# Patient Record
Sex: FEMALE | Race: WHITE | HISPANIC OR LATINO | Employment: UNEMPLOYED | URBAN - NONMETROPOLITAN AREA
[De-identification: names, ages, dates, MRNs, and addresses within clinical notes are randomized per-mention and may not be internally consistent; named-entity substitution may affect disease eponyms.]

---

## 2019-04-11 ENCOUNTER — OFFICE VISIT (OUTPATIENT)
Dept: URGENT CARE | Facility: PHYSICIAN GROUP | Age: 59
End: 2019-04-11
Payer: OTHER GOVERNMENT

## 2019-04-11 VITALS
TEMPERATURE: 98.5 F | OXYGEN SATURATION: 98 % | WEIGHT: 125.6 LBS | RESPIRATION RATE: 16 BRPM | SYSTOLIC BLOOD PRESSURE: 110 MMHG | HEIGHT: 63 IN | DIASTOLIC BLOOD PRESSURE: 80 MMHG | HEART RATE: 77 BPM | BODY MASS INDEX: 22.25 KG/M2

## 2019-04-11 DIAGNOSIS — R10.9 BILATERAL FLANK PAIN: ICD-10-CM

## 2019-04-11 DIAGNOSIS — R10.11 RUQ PAIN: ICD-10-CM

## 2019-04-11 DIAGNOSIS — R10.31 RLQ ABDOMINAL PAIN: ICD-10-CM

## 2019-04-11 LAB
APPEARANCE UR: CLEAR
BILIRUB UR STRIP-MCNC: NEGATIVE MG/DL
COLOR UR AUTO: YELLOW
GLUCOSE UR STRIP.AUTO-MCNC: NEGATIVE MG/DL
KETONES UR STRIP.AUTO-MCNC: NEGATIVE MG/DL
LEUKOCYTE ESTERASE UR QL STRIP.AUTO: NEGATIVE
NITRITE UR QL STRIP.AUTO: POSITIVE
PH UR STRIP.AUTO: 5.5 [PH] (ref 5–8)
PROT UR QL STRIP: NEGATIVE MG/DL
RBC UR QL AUTO: NEGATIVE
SP GR UR STRIP.AUTO: 1.02
UROBILINOGEN UR STRIP-MCNC: 0.2 MG/DL

## 2019-04-11 PROCEDURE — 99204 OFFICE O/P NEW MOD 45 MIN: CPT | Performed by: PHYSICIAN ASSISTANT

## 2019-04-11 PROCEDURE — 81002 URINALYSIS NONAUTO W/O SCOPE: CPT | Performed by: PHYSICIAN ASSISTANT

## 2019-04-11 RX ORDER — NITROFURANTOIN 25; 75 MG/1; MG/1
100 CAPSULE ORAL 2 TIMES DAILY
Qty: 10 CAP | Refills: 0 | Status: SHIPPED | OUTPATIENT
Start: 2019-04-11 | End: 2019-05-02

## 2019-04-11 NOTE — PROGRESS NOTES
Chief Complaint   Patient presents with   • Abdominal Pain     all left side   • Bloated       HISTORY OF PRESENT ILLNESS: Patient is a 58 y.o. female who presents today for the following:    Patient comes in for evaluation of a 2-day history of left flank pain.  She states it starts in the left flank and radiates around to the left anterior inferior ribs.  It does make breathing slightly difficult.  She denies any injuries, rashes, and skin changes.  She feels like the pain is deep, stating her skin does not seem to be sensitive.  She denies any history of the same.  She denies fever, recent illness, changes in bowels.  She had a colonoscopy and EGD approximately 1 month.  She states that her siblings have a history of cancer and they are making sure she does not.  Patient's LMP was 10 years ago.  She has no history of any surgeries.    There are no active problems to display for this patient.      Allergies:Patient has no known allergies.    Current Outpatient Prescriptions Ordered in [facility]   Medication Sig Dispense Refill   • nitrofurantoin monohyd macro (MACROBID) 100 MG Cap Take 1 Cap by mouth 2 times a day. 10 Cap 0     No current Epic-ordered facility-administered medications on file.        No past medical history on file.    Social History   Substance Use Topics   • Smoking status: Never Smoker   • Smokeless tobacco: Never Used   • Alcohol use No       No family status information on file.   No family history on file.    Review of Systems:    Constitutional ROS: No unexpected change in weight, No weakness, No fatigue  Eye ROS: No recent significant change in vision, No eye pain, redness, discharge  Ear ROS: No drainage, No tinnitus or vertigo, No recent change in hearing  Mouth/Throat ROS: No teeth or gum problems, No bleeding gums, No tongue complaints  Neck ROS: No swollen glands, No significant pain in neck  Pulmonary ROS: No chronic cough, sputum, or hemoptysis, No dyspnea on exertion, No  "wheezing  Cardiovascular ROS: No diaphoresis, No edema, No palpitations  Gastrointestinal ROS: No change in bowel habits, No significant change in appetite, No nausea, vomiting, diarrhea, or constipation  Musculoskeletal/Extremities ROS: No peripheral edema, No pain, redness or swelling on the joints  Hematologic/Lymphatic ROS: No chills, No night sweats, No weight loss  Skin/Integumentary ROS: No edema, No evidence of rash, No itching      Exam:  /80   Pulse 77   Temp 36.9 °C (98.5 °F) (Temporal)   Resp 16   Ht 1.6 m (5' 3\")   Wt 57 kg (125 lb 9.6 oz)   SpO2 98%   General: Well developed, well nourished. No distress.  Pulmonary: Unlabored respiratory effort. Lungs clear to auscultation, no wheezes, no rhonchi.  Cardiovascular: Regular rate and rhythm without murmur.     Back: Mild flank tenderness noted bilaterally without overt CVA tenderness.  Abdomen: Soft,  nondistended. No hepatosplenomegaly.  Right upper quadrant and right lower quadrant tenderness noted without guarding or rebound.  Neurologic: Grossly nonfocal. No facial asymmetry noted.  Skin: Warm, dry, good turgor. No rashes in visible areas.   Psych: Normal mood. Alert and oriented x3. Judgment and insight is normal.    UA: Positive nitrates, otherwise negative    Assessment/Plan:  Patient states she was treated for urinary tract infection about a month ago.  She continues to have cloudy urine in the mornings and has had cloudy urine with a foul odor today.  Her only new symptoms are bilateral flank pain and right lower quadrant pain.  UA is abnormal.  Given presenting symptoms will start antibiotics and contact patient with culture results.  Discussed ER precautions for worsening symptoms.    Entire clinic visit was conducted using  #[id number], Brennen.  1. RUQ pain  POCT Urinalysis    nitrofurantoin monohyd macro (MACROBID) 100 MG Cap   2. RLQ abdominal pain  POCT Urinalysis    nitrofurantoin monohyd macro (MACROBID) 100 MG Cap   3. " Bilateral flank pain  POCT Urinalysis    nitrofurantoin monohyd macro (MACROBID) 100 MG Cap

## 2019-04-15 ENCOUNTER — TELEPHONE (OUTPATIENT)
Dept: URGENT CARE | Facility: PHYSICIAN GROUP | Age: 59
End: 2019-04-15

## 2019-04-15 DIAGNOSIS — R39.9 URINARY SYMPTOM OR SIGN: ICD-10-CM

## 2019-04-15 NOTE — TELEPHONE ENCOUNTER
1. Caller Name: Pt.                                         Call Back Number: [phone] (home)       Patient approves a detailed voicemail message: yes    2. Patient is requesting results for UA. Stated you ran a UA and it was abnormal so you ordered it to be sent to lab. I dont see order in chart or any results for a urinalysis just a POCT. Pt. did confirm that she did get her RX from pharmacy.

## 2019-04-15 NOTE — TELEPHONE ENCOUNTER
It does not appear that I ordered a culture. I will put an order in the computer right now so if she is still having symptoms, have her go to a lab to leave a urine sample.

## 2019-04-18 ENCOUNTER — HOSPITAL ENCOUNTER (OUTPATIENT)
Facility: MEDICAL CENTER | Age: 59
End: 2019-04-18
Attending: PHYSICIAN ASSISTANT
Payer: OTHER GOVERNMENT

## 2019-04-18 DIAGNOSIS — R39.9 URINARY SYMPTOM OR SIGN: ICD-10-CM

## 2019-04-18 PROCEDURE — 87086 URINE CULTURE/COLONY COUNT: CPT

## 2019-04-18 PROCEDURE — 87077 CULTURE AEROBIC IDENTIFY: CPT

## 2019-04-18 PROCEDURE — 87186 SC STD MICRODIL/AGAR DIL: CPT

## 2019-04-18 NOTE — TELEPHONE ENCOUNTER
Phone Number Called: [phone] (home)     Message: LVM for pt. With below message    Left Message for patient to call back: N\A

## 2019-04-21 LAB
BACTERIA UR CULT: ABNORMAL
BACTERIA UR CULT: ABNORMAL
SIGNIFICANT IND 70042: ABNORMAL
SITE SITE: ABNORMAL
SOURCE SOURCE: ABNORMAL

## 2019-04-22 DIAGNOSIS — R39.9 URINARY SYMPTOM OR SIGN: ICD-10-CM

## 2019-04-22 RX ORDER — CEFDINIR 300 MG/1
300 CAPSULE ORAL 2 TIMES DAILY
Qty: 10 CAP | Refills: 0 | Status: SHIPPED | OUTPATIENT
Start: 2019-04-22 | End: 2019-04-27

## 2019-04-23 ENCOUNTER — TELEPHONE (OUTPATIENT)
Dept: MEDICAL GROUP | Facility: PHYSICIAN GROUP | Age: 59
End: 2019-04-23

## 2019-04-23 NOTE — TELEPHONE ENCOUNTER
----- Message from Janet Jama P.A.-C. sent at 4/22/2019 10:08 AM PDT -----  Please let pt know the urine culture is positive for infection.  I will send a second antibiotic to the pharmacy if patient continues to have symptoms.

## 2019-05-02 ENCOUNTER — OFFICE VISIT (OUTPATIENT)
Dept: URGENT CARE | Facility: PHYSICIAN GROUP | Age: 59
End: 2019-05-02
Payer: OTHER GOVERNMENT

## 2019-05-02 ENCOUNTER — HOSPITAL ENCOUNTER (OUTPATIENT)
Facility: MEDICAL CENTER | Age: 59
End: 2019-05-02
Attending: PHYSICIAN ASSISTANT
Payer: OTHER GOVERNMENT

## 2019-05-02 VITALS
SYSTOLIC BLOOD PRESSURE: 112 MMHG | DIASTOLIC BLOOD PRESSURE: 82 MMHG | RESPIRATION RATE: 16 BRPM | BODY MASS INDEX: 22.32 KG/M2 | TEMPERATURE: 98.1 F | OXYGEN SATURATION: 98 % | HEART RATE: 64 BPM | WEIGHT: 126 LBS

## 2019-05-02 DIAGNOSIS — R10.9 BILATERAL FLANK PAIN: ICD-10-CM

## 2019-05-02 DIAGNOSIS — R30.0 DYSURIA: ICD-10-CM

## 2019-05-02 LAB
APPEARANCE UR: CLEAR
BILIRUB UR STRIP-MCNC: NORMAL MG/DL
COLOR UR AUTO: YELLOW
GLUCOSE UR STRIP.AUTO-MCNC: NORMAL MG/DL
KETONES UR STRIP.AUTO-MCNC: NORMAL MG/DL
LEUKOCYTE ESTERASE UR QL STRIP.AUTO: NORMAL
NITRITE UR QL STRIP.AUTO: NORMAL
PH UR STRIP.AUTO: 5 [PH] (ref 5–8)
PROT UR QL STRIP: NORMAL MG/DL
RBC UR QL AUTO: NORMAL
SP GR UR STRIP.AUTO: 1.02
UROBILINOGEN UR STRIP-MCNC: 0.2 MG/DL

## 2019-05-02 PROCEDURE — 81002 URINALYSIS NONAUTO W/O SCOPE: CPT | Performed by: PHYSICIAN ASSISTANT

## 2019-05-02 PROCEDURE — 99214 OFFICE O/P EST MOD 30 MIN: CPT | Performed by: PHYSICIAN ASSISTANT

## 2019-05-02 PROCEDURE — 87086 URINE CULTURE/COLONY COUNT: CPT

## 2019-05-02 RX ORDER — PHENAZOPYRIDINE HYDROCHLORIDE 200 MG/1
200 TABLET, FILM COATED ORAL 3 TIMES DAILY PRN
Qty: 15 TAB | Refills: 0 | Status: SHIPPED | OUTPATIENT
Start: 2019-05-02 | End: 2022-04-08

## 2019-05-02 RX ORDER — CEFDINIR 300 MG/1
300 CAPSULE ORAL EVERY 12 HOURS
Qty: 10 CAP | Refills: 0 | Status: SHIPPED | OUTPATIENT
Start: 2019-05-02 | End: 2019-05-07

## 2019-05-02 NOTE — PROGRESS NOTES
"Chief Complaint   Patient presents with   • UTI     Patient has been treated 2x in the last 3 months    • Back Pain     x 2 days        HISTORY OF PRESENT ILLNESS: Patient is a 59 y.o. female who presents today for the following:    Patient comes in with her  for evaluation of \"hot\" urine over the last couple of days.  She complains of bilateral flank pain, right worse than left.  She does report some lower abdominal discomfort but primarily in the urinary tract.  She denies fever, night sweats, chills, body aches, nausea, vomiting.  She has recently been treated for urinary tract infection.  She does not currently have a primary care provider.    There are no active problems to display for this patient.      Allergies:Patient has no known allergies.    Current Outpatient Prescriptions Ordered in [facility]   Medication Sig Dispense Refill   • cefdinir (OMNICEF) 300 MG Cap Take 1 Cap by mouth every 12 hours for 5 days. DO NOT TAKE unless symptoms unless symptoms worsen 10 Cap 0   • phenazopyridine (PYRIDIUM) 200 MG Tab Take 1 Tab by mouth 3 times a day as needed for Mild Pain or Moderate Pain. 15 Tab 0   • Pseudoephedrine-DM-GG (ROBITUSSIN COLD & COUGH PO) Take  by mouth.       No current Epic-ordered facility-administered medications on file.        No past medical history on file.    Social History   Substance Use Topics   • Smoking status: Never Smoker   • Smokeless tobacco: Never Used   • Alcohol use No       No family status information on file.   No family history on file.    Review of Systems:   Constitutional ROS: No unexpected change in weight, No weakness, No fatigue  Eye ROS: No recent significant change in vision, No eye pain, redness, discharge  Ear ROS: No drainage, No tinnitus or vertigo, No recent change in hearing  Mouth/Throat ROS: No teeth or gum problems, No bleeding gums, No tongue complaints  Neck ROS: No swollen glands, No significant pain in neck  Pulmonary ROS: No chronic cough, sputum, or " hemoptysis, No dyspnea on exertion, No wheezing  Cardiovascular ROS: No diaphoresis, No edema, No palpitations  Gastrointestinal ROS: No change in bowel habits, No significant change in appetite, No nausea, vomiting, diarrhea, or constipation  Musculoskeletal/Extremities ROS: No peripheral edema, No pain, redness or swelling on the joints  Hematologic/Lymphatic ROS: No chills, No night sweats, No weight loss  Skin/Integumentary ROS: No edema, No evidence of rash, No itching      Exam:  /82   Pulse 64   Temp 36.7 °C (98.1 °F) (Temporal)   Resp 16   Wt 57.2 kg (126 lb)   SpO2 98%   General: Well developed, well nourished. No distress.  Pulmonary: Unlabored respiratory effort.   Back: No CVA tenderness noted.  Neurologic: Grossly nonfocal. No facial asymmetry noted.  Skin: Warm, dry, good turgor. No rashes in visible areas.   Psych: Normal mood. Alert and oriented x3. Judgment and insight is normal.    UA: Trace leukocytes, otherwise negative    Assessment/Plan:  Discussed UA results with the patient and her .  Will have patient start Pyridium to hold off on antibiotics for now.  Will contact patient with culture results but have advised taking antibiotics if symptoms worsen prior to culture availability.  Drink plenty fluid. Follow up for worsening or persistent symptoms.  1. Bilateral flank pain  POCT Urinalysis    Urine Culture    cefdinir (OMNICEF) 300 MG Cap   2. Dysuria  POCT Urinalysis    Urine Culture    cefdinir (OMNICEF) 300 MG Cap    phenazopyridine (PYRIDIUM) 200 MG Tab

## 2019-05-04 LAB
BACTERIA UR CULT: NORMAL
SIGNIFICANT IND 70042: NORMAL
SITE SITE: NORMAL
SOURCE SOURCE: NORMAL

## 2019-05-05 ENCOUNTER — TELEPHONE (OUTPATIENT)
Dept: URGENT CARE | Facility: PHYSICIAN GROUP | Age: 59
End: 2019-05-05

## 2019-05-05 NOTE — TELEPHONE ENCOUNTER
----- Message from Janet Jama P.A.-C. sent at 5/5/2019  9:02 AM PDT -----  Please let pt know the urine culture is negative. Follow up for persistent symptoms.

## 2019-12-21 ENCOUNTER — APPOINTMENT (OUTPATIENT)
Dept: URGENT CARE | Facility: PHYSICIAN GROUP | Age: 59
End: 2019-12-21
Payer: OTHER GOVERNMENT

## 2022-04-08 ENCOUNTER — OFFICE VISIT (OUTPATIENT)
Dept: URGENT CARE | Facility: PHYSICIAN GROUP | Age: 62
End: 2022-04-08

## 2022-04-08 VITALS
TEMPERATURE: 97.9 F | BODY MASS INDEX: 21.34 KG/M2 | WEIGHT: 113 LBS | OXYGEN SATURATION: 98 % | HEART RATE: 88 BPM | DIASTOLIC BLOOD PRESSURE: 72 MMHG | RESPIRATION RATE: 16 BRPM | SYSTOLIC BLOOD PRESSURE: 122 MMHG | HEIGHT: 61 IN

## 2022-04-08 DIAGNOSIS — R59.0 LYMPHADENOPATHY, AXILLARY: ICD-10-CM

## 2022-04-08 DIAGNOSIS — T14.8XXA INFECTED WOUND: ICD-10-CM

## 2022-04-08 DIAGNOSIS — L08.9 INFECTED WOUND: ICD-10-CM

## 2022-04-08 PROCEDURE — 99214 OFFICE O/P EST MOD 30 MIN: CPT | Mod: 25

## 2022-04-08 PROCEDURE — 90471 IMMUNIZATION ADMIN: CPT

## 2022-04-08 PROCEDURE — 90714 TD VACC NO PRESV 7 YRS+ IM: CPT

## 2022-04-08 RX ORDER — CLINDAMYCIN HYDROCHLORIDE 300 MG/1
300 CAPSULE ORAL 3 TIMES DAILY
Qty: 15 CAPSULE | Refills: 0 | Status: SHIPPED | OUTPATIENT
Start: 2022-04-08 | End: 2022-04-13

## 2022-04-08 ASSESSMENT — ENCOUNTER SYMPTOMS
RESPIRATORY NEGATIVE: 1
NEUROLOGICAL NEGATIVE: 1
MUSCULOSKELETAL NEGATIVE: 1
CHILLS: 0
CARDIOVASCULAR NEGATIVE: 1
EYES NEGATIVE: 1
FEVER: 0
PSYCHIATRIC NEGATIVE: 1
ROS SKIN COMMENTS: INFECTED WOUND
GASTROINTESTINAL NEGATIVE: 1

## 2023-12-06 ENCOUNTER — OFFICE VISIT (OUTPATIENT)
Dept: URGENT CARE | Facility: PHYSICIAN GROUP | Age: 63
End: 2023-12-06
Payer: OTHER GOVERNMENT

## 2023-12-06 VITALS
OXYGEN SATURATION: 96 % | RESPIRATION RATE: 14 BRPM | WEIGHT: 114 LBS | DIASTOLIC BLOOD PRESSURE: 76 MMHG | HEART RATE: 73 BPM | BODY MASS INDEX: 21.54 KG/M2 | TEMPERATURE: 98.1 F | SYSTOLIC BLOOD PRESSURE: 100 MMHG

## 2023-12-06 DIAGNOSIS — J06.9 VIRAL URI: ICD-10-CM

## 2023-12-06 DIAGNOSIS — R05.1 ACUTE COUGH: ICD-10-CM

## 2023-12-06 PROCEDURE — 3078F DIAST BP <80 MM HG: CPT | Performed by: PHYSICIAN ASSISTANT

## 2023-12-06 PROCEDURE — 99213 OFFICE O/P EST LOW 20 MIN: CPT | Performed by: PHYSICIAN ASSISTANT

## 2023-12-06 PROCEDURE — 3074F SYST BP LT 130 MM HG: CPT | Performed by: PHYSICIAN ASSISTANT

## 2023-12-06 RX ORDER — BENZONATATE 100 MG/1
100 CAPSULE ORAL 3 TIMES DAILY PRN
Qty: 60 CAPSULE | Refills: 0 | Status: SHIPPED | OUTPATIENT
Start: 2023-12-06

## 2023-12-06 ASSESSMENT — ENCOUNTER SYMPTOMS
SHORTNESS OF BREATH: 0
COUGH: 1
FEVER: 0
HEADACHES: 1
VOMITING: 0
DIARRHEA: 0
SORE THROAT: 1
NAUSEA: 0
EYE DISCHARGE: 0
EYE REDNESS: 0
MYALGIAS: 1

## 2023-12-06 NOTE — PROGRESS NOTES
Subjective     Anisha Ahn is a 63 y.o. female who presents with Cough (Causing chest pain)            URI   This is a new problem. Episode onset: x 5 days ago. The problem has been gradually worsening. There has been no fever. Associated symptoms include chest pain (The patient reports soreness to her chest wall secondary to coughing.), congestion, coughing, headaches, a plugged ear sensation and a sore throat. Pertinent negatives include no diarrhea, ear pain, nausea or vomiting. Treatments tried: OTC cough drops.     The patient states her  was recently sick with similar symptoms.  The patient reports no known exposure to COVID-19 and/or influenza.    PMH:  has no past medical history on file.  MEDS: No current outpatient medications on file.  ALLERGIES: No Known Allergies  SURGHX: No past surgical history on file.  SOCHX:  reports that she has never smoked. She has never used smokeless tobacco. She reports that she does not drink alcohol and does not use drugs.  FH: Family history was reviewed, no pertinent findings to report    Review of Systems   Constitutional:  Negative for fever.   HENT:  Positive for congestion and sore throat. Negative for ear pain.    Eyes:  Negative for discharge and redness.   Respiratory:  Positive for cough. Negative for shortness of breath.    Cardiovascular:  Positive for chest pain (The patient reports soreness to her chest wall secondary to coughing.).   Gastrointestinal:  Negative for diarrhea, nausea and vomiting.   Musculoskeletal:  Positive for myalgias.   Neurological:  Positive for headaches.              Objective     /76   Pulse 73   Temp 36.7 °C (98.1 °F) (Temporal)   Resp 14   Wt 51.7 kg (114 lb)   SpO2 96%   BMI 21.54 kg/m²      Physical Exam  Constitutional:       General: She is not in acute distress.     Appearance: Normal appearance. She is not ill-appearing.   HENT:      Head: Normocephalic and atraumatic.      Right Ear: Tympanic  membrane, ear canal and external ear normal.      Left Ear: Tympanic membrane, ear canal and external ear normal.      Nose: Nose normal.      Mouth/Throat:      Mouth: Mucous membranes are moist.      Pharynx: Oropharynx is clear. Uvula midline. Posterior oropharyngeal erythema (mild) present.      Tonsils: No tonsillar exudate.   Eyes:      Extraocular Movements: Extraocular movements intact.      Conjunctiva/sclera: Conjunctivae normal.   Cardiovascular:      Rate and Rhythm: Normal rate and regular rhythm.      Heart sounds: Normal heart sounds.   Pulmonary:      Effort: Pulmonary effort is normal. No respiratory distress.      Breath sounds: Normal breath sounds. No wheezing.   Musculoskeletal:         General: Normal range of motion.      Cervical back: Normal range of motion and neck supple.   Skin:     General: Skin is warm and dry.   Neurological:      Mental Status: She is alert and oriented to person, place, and time.             Progress:  The patient declined viral testing at this time.             Assessment & Plan         services were used throughout the duration of this encounter.     1. Viral URI    2. Acute cough  - benzonatate (TESSALON) 100 MG Cap; Take 1 Capsule by mouth 3 times a day as needed for Cough.  Dispense: 60 Capsule; Refill: 0    The patient's presenting symptoms and physical exam findings are consistent with a viral URI with associated cough.  The patient's physical exam today in clinic was normal, with the exception of mild erythema to the posterior pharynx.  The patient's lungs were clear auscultation without wheezing or rhonchi, and her pulse ox is within normal limits.  The patient is nontoxic and appears in no acute distress.  The patient's vital signs are stable and within normal limits.  She is afebrile today in clinic.  Discussed likely viral etiology with the patient.  The patient declined viral testing at this time.  We will prescribe patient Tessalon for  symptomatic relief of her cough.  Advised patient to monitor for worsening signs or symptoms.  Recommend OTC medications and supportive care for symptomatic management.  Recommend patient follow-up with her PCP.  Discussion precautions with the patient, and she verbalized understanding.    Differential diagnoses, supportive care, and indications for immediate follow-up discussed with patient.   Instructed to return to clinic or nearest emergency department for any change in condition, further concerns, or worsening of symptoms.    OTC Tylenol or Motrin for fever/discomfort.  OTC cough/cold medication for symptomatic relief  OTC Supportive Care for Congestion - saline nasal spray or neti pot  Drink plenty of fluids  Follow-up with PCP  Return to clinic or go to the ED if symptoms worsen or fail to improve, or if the patient should develop worsening/increasing cough, congestion, ear pain, sore throat, shortness of breath, wheezing, chest pain, fever/chills, and/or any concerning symptoms.    Discussed plan with the patient, and she agrees to the above.     I personally reviewed prior external notes and test results pertinent to today's visit.  I have independently reviewed and interpreted all diagnostics ordered during this urgent care visit.     Please note that this dictation was created using voice recognition software. I have made every reasonable attempt to correct obvious errors, but I expect that there may be errors of grammar and possibly content that I did not discover before finalizing the note.     This note was electronically signed by Cande León PA-C